# Patient Record
Sex: MALE | Race: WHITE | Employment: UNEMPLOYED | URBAN - METROPOLITAN AREA
[De-identification: names, ages, dates, MRNs, and addresses within clinical notes are randomized per-mention and may not be internally consistent; named-entity substitution may affect disease eponyms.]

---
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
## 2024-08-20 ENCOUNTER — OFFICE VISIT (OUTPATIENT)
Age: 19
End: 2024-08-20

## 2024-08-20 VITALS
HEART RATE: 63 BPM | WEIGHT: 147 LBS | BODY MASS INDEX: 21.77 KG/M2 | DIASTOLIC BLOOD PRESSURE: 78 MMHG | HEIGHT: 69 IN | TEMPERATURE: 97.7 F | OXYGEN SATURATION: 98 % | RESPIRATION RATE: 16 BRPM | SYSTOLIC BLOOD PRESSURE: 114 MMHG

## 2024-08-20 DIAGNOSIS — L03.012 PARONYCHIA OF LEFT MIDDLE FINGER: Primary | ICD-10-CM

## 2024-08-20 RX ORDER — CEPHALEXIN 500 MG/1
500 CAPSULE ORAL 3 TIMES DAILY
Qty: 21 CAPSULE | Refills: 0 | Status: SHIPPED | OUTPATIENT
Start: 2024-08-20 | End: 2024-08-27

## 2024-08-20 NOTE — PROGRESS NOTES
José Miguel Chavez (:  2005) is a 19 y.o. male,New patient, here for evaluation of the following chief complaint(s):  Other (L middle finger began swelling and filled with pus on , has worsened since then. Pt applied neosporin to the area. )      Assessment & Plan :  Visit Diagnoses and Associated Orders       ORDERS WITHOUT AN ASSOCIATED DIAGNOSIS    cephALEXin (KEFLEX) 500 MG capsule [9500]                 Follow up in 5-7 days if symptoms persist or if symptoms worsen.       Subjective :  Denies fever/chills           19 y.o. male presents with paronychia of left middle finger - symptoms started .  Neosporin has not helped.  Consented for procedure.       Vitals:    24 1024   BP: 114/78   Site: Right Upper Arm   Position: Sitting   Cuff Size: Medium Adult   Pulse: 63   Resp: 16   Temp: 97.7 °F (36.5 °C)   TempSrc: Oral   SpO2: 98%   Weight: 66.7 kg (147 lb)   Height: 1.753 m (5' 9\")       No results found for this visit on 24.      Objective   Physical Exam  Constitutional:       Appearance: Normal appearance.   Skin:     General: Skin is warm.      Comments: Left middle finger with erythema and purulence around base of nail - significant tenderness to palpation, consistent with paronychia   Neurological:      Mental Status: He is alert.           INCISION AND DRAINAGE    Date/Time: 2024 10:48 AM    Performed by: Christina Jeffries PA-C  Authorized by: Christina Jeffries PA-C  Type: abscess  Body area: upper extremity  Location details: left long finger    Sedation:  Patient sedated: no    Scalpel size: 11  Incision type: single straight  Incision depth: subcutaneous  Complexity: simple  Drainage: purulent  Drainage amount: scant  Wound treatment: wound left open  Packing material: none  Comments: Wound dressed with bacitracin and bandaid.              An electronic signature was used to authenticate this note.    Christina Jeffries PA-C

## 2024-09-11 ENCOUNTER — OFFICE VISIT (OUTPATIENT)
Age: 19
End: 2024-09-11

## 2024-09-11 VITALS
RESPIRATION RATE: 16 BRPM | HEART RATE: 67 BPM | WEIGHT: 145.2 LBS | DIASTOLIC BLOOD PRESSURE: 77 MMHG | TEMPERATURE: 98.3 F | SYSTOLIC BLOOD PRESSURE: 120 MMHG | BODY MASS INDEX: 21.51 KG/M2 | OXYGEN SATURATION: 97 % | HEIGHT: 69 IN

## 2024-09-11 DIAGNOSIS — L03.012 PARONYCHIA OF LEFT THUMB: Primary | ICD-10-CM

## 2024-09-11 RX ORDER — CEPHALEXIN 500 MG/1
500 CAPSULE ORAL 3 TIMES DAILY
Qty: 30 CAPSULE | Refills: 0 | Status: SHIPPED | OUTPATIENT
Start: 2024-09-11 | End: 2024-09-21

## 2024-09-29 ENCOUNTER — OFFICE VISIT (OUTPATIENT)
Age: 19
End: 2024-09-29

## 2024-09-29 VITALS
HEIGHT: 69 IN | HEART RATE: 59 BPM | DIASTOLIC BLOOD PRESSURE: 58 MMHG | OXYGEN SATURATION: 97 % | TEMPERATURE: 98.5 F | WEIGHT: 146 LBS | BODY MASS INDEX: 21.62 KG/M2 | RESPIRATION RATE: 16 BRPM | SYSTOLIC BLOOD PRESSURE: 92 MMHG

## 2024-09-29 DIAGNOSIS — L03.011 PARONYCHIA OF FINGER, RIGHT: Primary | ICD-10-CM

## 2024-09-29 DIAGNOSIS — B07.9 VIRAL WARTS, UNSPECIFIED TYPE: ICD-10-CM

## 2024-09-29 RX ORDER — SULFAMETHOXAZOLE/TRIMETHOPRIM 800-160 MG
1 TABLET ORAL 2 TIMES DAILY
Qty: 14 TABLET | Refills: 0 | Status: SHIPPED | OUTPATIENT
Start: 2024-09-29 | End: 2024-10-06